# Patient Record
Sex: FEMALE | Race: OTHER
[De-identification: names, ages, dates, MRNs, and addresses within clinical notes are randomized per-mention and may not be internally consistent; named-entity substitution may affect disease eponyms.]

---

## 2021-07-25 ENCOUNTER — TRANSCRIPTION ENCOUNTER (OUTPATIENT)
Age: 41
End: 2021-07-25

## 2021-11-04 DIAGNOSIS — Z78.9 OTHER SPECIFIED HEALTH STATUS: ICD-10-CM

## 2021-11-04 DIAGNOSIS — Z82.49 FAMILY HISTORY OF ISCHEMIC HEART DISEASE AND OTHER DISEASES OF THE CIRCULATORY SYSTEM: ICD-10-CM

## 2021-11-04 RX ORDER — PRENATAL VIT 49/IRON FUM/FOLIC 6.75-0.2MG
TABLET ORAL
Refills: 0 | Status: ACTIVE | COMMUNITY

## 2021-11-08 ENCOUNTER — NON-APPOINTMENT (OUTPATIENT)
Age: 41
End: 2021-11-08

## 2021-11-08 ENCOUNTER — APPOINTMENT (OUTPATIENT)
Dept: CARDIOLOGY | Facility: CLINIC | Age: 41
End: 2021-11-08
Payer: COMMERCIAL

## 2021-11-08 VITALS
BODY MASS INDEX: 31.41 KG/M2 | SYSTOLIC BLOOD PRESSURE: 168 MMHG | WEIGHT: 160 LBS | HEART RATE: 90 BPM | HEIGHT: 60 IN | OXYGEN SATURATION: 100 % | DIASTOLIC BLOOD PRESSURE: 104 MMHG

## 2021-11-08 DIAGNOSIS — R01.1 CARDIAC MURMUR, UNSPECIFIED: ICD-10-CM

## 2021-11-08 PROCEDURE — 93000 ELECTROCARDIOGRAM COMPLETE: CPT

## 2021-11-08 PROCEDURE — 99204 OFFICE O/P NEW MOD 45 MIN: CPT

## 2021-11-08 NOTE — DISCUSSION/SUMMARY
[FreeTextEntry1] : Her blood pressure is clearly problematic today. I am confident that there is a degree of reactive hypertension. The question is how significant a degree of reactive hypertension there is.\par \par She will continue labetalol for now. She will schedule an echocardiogram. She will start checking her blood pressure regularly at home, and see me in the office for blood pressure check in 1 month. There is the possibility that she will require an ambulatory 24-hour blood pressure monitor.

## 2021-11-08 NOTE — HISTORY OF PRESENT ILLNESS
[FreeTextEntry1] : Sally presented to the office today for a cardiovascular evaluation. She presents for the further evaluation of elevated blood pressures.\par \par She is now 41 years old, with a history of elevated blood pressures for many years. She was not treated until recently. She does not have longstanding high cholesterol, but apparently was more recently discovered to have borderline lipid readings recently. She had a cardiac murmur that was diagnosed many years ago, for which an echocardiogram was performed in the past, apparently normal.\par \par At baseline, she is sedentary. She got  over the summer, and has gained weight since then, perhaps 15 to 20 pounds. With regular physical activities she reports experiencing a degree of dyspnea, not clearly out of proportion to her degree of weight gain and deconditioning. She denies orthopnea, PND and edema. She denies palpitations, dizziness and syncope.\par \par She is considering getting pregnant. She was recently discovered to have very elevated blood pressures, and was started on labetalol. She was told to see a cardiologist. Her parents have seen before many years, and she therefore comes to my office for evaluation.\par \par She does say that her blood pressure at home may be anywhere from the range of 120 up to 140, over 60.

## 2021-11-16 ENCOUNTER — MED ADMIN CHARGE (OUTPATIENT)
Age: 41
End: 2021-11-16

## 2021-11-16 ENCOUNTER — APPOINTMENT (OUTPATIENT)
Dept: CARDIOLOGY | Facility: CLINIC | Age: 41
End: 2021-11-16
Payer: COMMERCIAL

## 2021-11-16 PROCEDURE — 93306 TTE W/DOPPLER COMPLETE: CPT

## 2021-11-16 RX ORDER — PERFLUTREN 6.52 MG/ML
6.52 INJECTION, SUSPENSION INTRAVENOUS
Qty: 1 | Refills: 0 | Status: COMPLETED | OUTPATIENT
Start: 2021-11-16

## 2021-11-16 RX ADMIN — PERFLUTREN MG/ML: 6.52 INJECTION, SUSPENSION INTRAVENOUS at 00:00

## 2021-12-08 ENCOUNTER — APPOINTMENT (OUTPATIENT)
Dept: CARDIOLOGY | Facility: CLINIC | Age: 41
End: 2021-12-08
Payer: COMMERCIAL

## 2021-12-08 PROCEDURE — 93790 AMBL BP MNTR W/SW I&R: CPT

## 2021-12-16 ENCOUNTER — APPOINTMENT (OUTPATIENT)
Dept: CARDIOLOGY | Facility: CLINIC | Age: 41
End: 2021-12-16

## 2021-12-20 ENCOUNTER — APPOINTMENT (OUTPATIENT)
Dept: CARDIOLOGY | Facility: CLINIC | Age: 41
End: 2021-12-20
Payer: COMMERCIAL

## 2021-12-20 VITALS
HEIGHT: 60 IN | OXYGEN SATURATION: 100 % | HEART RATE: 121 BPM | BODY MASS INDEX: 31.8 KG/M2 | WEIGHT: 162 LBS | SYSTOLIC BLOOD PRESSURE: 185 MMHG | DIASTOLIC BLOOD PRESSURE: 132 MMHG

## 2021-12-20 PROCEDURE — 99214 OFFICE O/P EST MOD 30 MIN: CPT

## 2021-12-20 NOTE — HISTORY OF PRESENT ILLNESS
[FreeTextEntry1] : Sally presented to the office today for a cardiovascular evaluation.  I saw her 6 weeks ago.\par She is now 41 years old, with a history of elevated blood pressures for many years. She was not treated until recently. She does not have longstanding high cholesterol, but apparently was more recently discovered to have borderline lipid readings recently. She had a cardiac murmur that was diagnosed many years ago, for which an echocardiogram was performed in the past, apparently normal.\par \par At baseline, she is sedentary. She got  over the summer, and has gained weight since then, perhaps 15 to 20 pounds. With regular physical activities she reports experiencing a degree of dyspnea, not clearly out of proportion to her degree of weight gain and deconditioning. She denies orthopnea, PND and edema. She denies palpitations, dizziness and syncope.\par \par When I saw her 6 weeks ago, she was considering getting pregnant.  She was discovered to have very elevated blood pressures, and was started on labetalol.\par \par I saw her in the office, and I felt that there was a component of reactive hypertension.  She went on to have an ambulatory 24-hour blood pressure monitor.  This strongly suggested the presence of reactive hypertension.\par \par She continues to feel well.  She does not have any reproducible symptoms.  She remains compliant with labetalol.  She found wearing the blood pressure monitor very difficult.

## 2021-12-20 NOTE — DISCUSSION/SUMMARY
[FreeTextEntry1] : She has a very dramatic increase in her blood pressure with anxiety, and the simple doctor's visit today has managed to raise her blood pressure to very high levels.  I think that this is all reactive hypertension.\par \par Although theoretically we could even consider the possibility of reducing or discontinuing labetalol, I think it is wise to keep it as it is.  Her blood pressures are generally controlled on the labetalol, and so it is reasonable to continue it, without any real change.\par \par She will see me in 3 months.  She will call me with questions in the meantime.

## 2022-03-21 ENCOUNTER — NON-APPOINTMENT (OUTPATIENT)
Age: 42
End: 2022-03-21

## 2022-03-21 ENCOUNTER — APPOINTMENT (OUTPATIENT)
Dept: CARDIOLOGY | Facility: CLINIC | Age: 42
End: 2022-03-21
Payer: COMMERCIAL

## 2022-03-21 VITALS
OXYGEN SATURATION: 100 % | DIASTOLIC BLOOD PRESSURE: 120 MMHG | HEIGHT: 60 IN | HEART RATE: 115 BPM | SYSTOLIC BLOOD PRESSURE: 178 MMHG | WEIGHT: 143 LBS | BODY MASS INDEX: 28.07 KG/M2

## 2022-03-21 PROCEDURE — 93000 ELECTROCARDIOGRAM COMPLETE: CPT

## 2022-03-21 PROCEDURE — 99214 OFFICE O/P EST MOD 30 MIN: CPT

## 2022-03-21 NOTE — HISTORY OF PRESENT ILLNESS
[FreeTextEntry1] : Sally presented to the office today for a cardiovascular evaluation.  I saw her 3 months ago.\par \par She is now 41 years old, with a history of elevated blood pressures for many years. She was not treated until recently. She does not have longstanding high cholesterol, but apparently was more recently discovered to have borderline lipid readings recently. She had a cardiac murmur that was diagnosed many years ago, for which an echocardiogram was performed in the past, apparently normal.\par \par At baseline, she is sedentary. She got  over the summer, and has gained weight since then, perhaps 15 to 20 pounds. With regular physical activities she reports experiencing a degree of dyspnea, not clearly out of proportion to her degree of weight gain and deconditioning. She denies orthopnea, PND and edema. She denies palpitations, dizziness and syncope.\par \par When I saw her 6 weeks ago, she was considering getting pregnant.  She was discovered to have very elevated blood pressures, and was started on labetalol.\par \par I saw her in the office, and I felt that there was a component of reactive hypertension.  She went on to have an ambulatory 24-hour blood pressure monitor.  This strongly suggested the presence of reactive hypertension.\par \par She continues to feel well.  She does not have any reproducible symptoms.  She remains compliant with labetalol.  She found wearing the blood pressure monitor very difficult.

## 2022-03-21 NOTE — DISCUSSION/SUMMARY
[FreeTextEntry1] : She has a very dramatic increase in her blood pressure with anxiety, and the simple doctor's visit today has managed to raise her blood pressure to very high levels.  I think that this is all reactive hypertension.\par \par Although theoretically we could even consider the possibility of reducing or discontinuing labetalol, I think it is wise to keep it as it is.  Her blood pressures are generally controlled on the labetalol, and so it is reasonable to continue it, without any real change.\par \par She will see me in 6 months.  She will call me with questions in the meantime.

## 2022-08-04 ENCOUNTER — APPOINTMENT (OUTPATIENT)
Dept: CARDIOLOGY | Facility: CLINIC | Age: 42
End: 2022-08-04

## 2022-08-04 ENCOUNTER — NON-APPOINTMENT (OUTPATIENT)
Age: 42
End: 2022-08-04

## 2022-08-04 PROCEDURE — 99211 OFF/OP EST MAY X REQ PHY/QHP: CPT

## 2022-08-04 PROCEDURE — 93306 TTE W/DOPPLER COMPLETE: CPT

## 2022-10-20 ENCOUNTER — NON-APPOINTMENT (OUTPATIENT)
Age: 42
End: 2022-10-20

## 2022-10-20 ENCOUNTER — APPOINTMENT (OUTPATIENT)
Dept: CARDIOLOGY | Facility: CLINIC | Age: 42
End: 2022-10-20

## 2022-10-20 VITALS
DIASTOLIC BLOOD PRESSURE: 90 MMHG | BODY MASS INDEX: 31.02 KG/M2 | WEIGHT: 158 LBS | HEART RATE: 101 BPM | HEIGHT: 60 IN | SYSTOLIC BLOOD PRESSURE: 158 MMHG | OXYGEN SATURATION: 100 %

## 2022-10-20 VITALS — DIASTOLIC BLOOD PRESSURE: 80 MMHG | SYSTOLIC BLOOD PRESSURE: 148 MMHG

## 2022-10-20 PROCEDURE — 93000 ELECTROCARDIOGRAM COMPLETE: CPT

## 2022-10-20 PROCEDURE — 99214 OFFICE O/P EST MOD 30 MIN: CPT | Mod: 25

## 2022-10-20 NOTE — ADDENDUM
[FreeTextEntry1] : Her blood pressure is very clearly at least in part related to anxiety.  She is tachycardic and hypertensive.  I do not think we can rely on her blood pressure readings here in the office.  She will schedule ambulatory 24-hour blood pressure monitoring.  We will make adjustments as appropriate.

## 2022-10-20 NOTE — HISTORY OF PRESENT ILLNESS
[FreeTextEntry1] : Sally presented to the office today for a cardiovascular evaluation.  I saw her 3 months ago.\par \par She is now 42 years old, with a history of elevated blood pressures for many years. She was not treated until recently. She does not have longstanding high cholesterol, but apparently was more recently discovered to have borderline lipid readings recently. She had a cardiac murmur that was diagnosed many years ago, for which an echocardiogram was performed in the past, apparently normal.\par \par At baseline, she is sedentary. She got  over the summer, and has gained weight since then, perhaps 15 to 20 pounds. With regular physical activities she reports experiencing a degree of dyspnea, not clearly out of proportion to her degree of weight gain and deconditioning. She denies orthopnea, PND and edema. She denies palpitations, dizziness and syncope.\par \par When I saw her 6 weeks ago, she was considering getting pregnant.  She was discovered to have very elevated blood pressures, and was started on labetalol.\par \par I saw her in the office, and I felt that there was a component of reactive hypertension.  She went on to have an ambulatory 24-hour blood pressure monitor.  This strongly suggested the presence of reactive hypertension.\par \par She continues to feel well.  She does not have any reproducible symptoms.  She remains compliant with labetalol.  She found wearing the blood pressure monitor very difficult.   \par \par Ms Mares arrives today for followup visit.\par She presented to the ED on October 7th, she was sent by her OB because her blood pressure was 148/90.  HEr labetalol was then increased to 400 BID. \par \par She is concerned because she thinks she may have been experiencing an allergic reaction to it. Her symptoms started this past Tuesday, she has an appointment this coming Monday with her allergist to followup her symptoms.

## 2022-10-20 NOTE — DISCUSSION/SUMMARY
[FreeTextEntry1] : Ms Mares is now 26 weeks gestation. She arrives in no acute distress but is clearly anxious.  She is euvolemic on exam. ECG illustrates sinus rhythm with rate in 90s.  Her blood pressure is still not optimal at this time.\par \par  The simple doctor's visit today has managed to raise her blood pressure to very high levels.  I think that this is all reactive hypertension given the clear anxiety that she is presenting with today. Previous 24 hour blood pressure monitoring performed a year ago demonstrated avg reading of 128/79, though she was not pregnant at the time.\par I advised that we proceed with a 24 hour blood pressure monitor to fully assess her blood pressure before adjusting her current medication regimen.  She will continue on Labetolol 400mg twice a day for now. \par \par I will call her with results of the monitor.  She will have the report from her allergist sent to me.\par Followup in one month.  She will call me with any questions or concerns.

## 2022-10-20 NOTE — PHYSICAL EXAM
[Normal] : alert and oriented, normal memory [Well Developed] : well developed [Well Nourished] : well nourished [No Acute Distress] : no acute distress [Normal Conjunctiva] : normal conjunctiva [Normal Venous Pressure] : normal venous pressure [No Carotid Bruit] : no carotid bruit [Normal S1, S2] : normal S1, S2 [No Rub] : no rub [No Gallop] : no gallop [Rhythm Regular] : regular [Normal S1] : normal S1 [Normal S2] : normal S2 [No Murmur] : no murmurs heard [No Abnormalities] : the abdominal aorta was not enlarged and no bruit was heard [Clear Lung Fields] : clear lung fields [Good Air Entry] : good air entry [No Respiratory Distress] : no respiratory distress  [Soft] : abdomen soft [Non Tender] : non-tender [No Masses/organomegaly] : no masses/organomegaly [Normal Bowel Sounds] : normal bowel sounds [Normal Gait] : normal gait [No Edema] : no edema [No Cyanosis] : no cyanosis [No Clubbing] : no clubbing [No Varicosities] : no varicosities [No Rash] : no rash [No Skin Lesions] : no skin lesions [Moves all extremities] : moves all extremities [No Focal Deficits] : no focal deficits [Normal Speech] : normal speech [Alert and Oriented] : alert and oriented [Normal memory] : normal memory [Right Carotid Bruit] : no bruit heard over the right carotid [Left Carotid Bruit] : no bruit heard over the left carotid

## 2022-10-31 ENCOUNTER — APPOINTMENT (OUTPATIENT)
Dept: CARDIOLOGY | Facility: CLINIC | Age: 42
End: 2022-10-31

## 2022-10-31 PROCEDURE — 93790 AMBL BP MNTR W/SW I&R: CPT

## 2022-11-03 ENCOUNTER — NON-APPOINTMENT (OUTPATIENT)
Age: 42
End: 2022-11-03

## 2022-12-29 ENCOUNTER — NON-APPOINTMENT (OUTPATIENT)
Age: 42
End: 2022-12-29

## 2022-12-29 ENCOUNTER — APPOINTMENT (OUTPATIENT)
Dept: CARDIOLOGY | Facility: CLINIC | Age: 42
End: 2022-12-29
Payer: COMMERCIAL

## 2022-12-29 VITALS
BODY MASS INDEX: 35.34 KG/M2 | HEART RATE: 114 BPM | DIASTOLIC BLOOD PRESSURE: 92 MMHG | HEIGHT: 60 IN | WEIGHT: 180 LBS | SYSTOLIC BLOOD PRESSURE: 159 MMHG | OXYGEN SATURATION: 100 %

## 2022-12-29 VITALS — SYSTOLIC BLOOD PRESSURE: 130 MMHG | DIASTOLIC BLOOD PRESSURE: 70 MMHG

## 2022-12-29 PROCEDURE — 93000 ELECTROCARDIOGRAM COMPLETE: CPT

## 2022-12-29 PROCEDURE — 99214 OFFICE O/P EST MOD 30 MIN: CPT

## 2023-01-06 ENCOUNTER — NON-APPOINTMENT (OUTPATIENT)
Age: 43
End: 2023-01-06

## 2023-01-06 NOTE — PHYSICAL EXAM
[Well Developed] : well developed [Well Nourished] : well nourished [No Acute Distress] : no acute distress [Normal Conjunctiva] : normal conjunctiva [Normal Venous Pressure] : normal venous pressure [No Carotid Bruit] : no carotid bruit [Normal S1, S2] : normal S1, S2 [No Rub] : no rub [No Gallop] : no gallop [Rhythm Regular] : regular [Normal S1] : normal S1 [Normal S2] : normal S2 [No Murmur] : no murmurs heard [No Abnormalities] : the abdominal aorta was not enlarged and no bruit was heard [Clear Lung Fields] : clear lung fields [Good Air Entry] : good air entry [No Respiratory Distress] : no respiratory distress  [Normal Gait] : normal gait [No Cyanosis] : no cyanosis [No Clubbing] : no clubbing [No Varicosities] : no varicosities [No Rash] : no rash [No Skin Lesions] : no skin lesions [Moves all extremities] : moves all extremities [No Focal Deficits] : no focal deficits [Normal Speech] : normal speech [Normal] : alert and oriented, normal memory [Alert and Oriented] : alert and oriented [Normal memory] : normal memory [Right Carotid Bruit] : no bruit heard over the right carotid [Left Carotid Bruit] : no bruit heard over the left carotid [de-identified] : s/p   [de-identified] : mild ankle edema

## 2023-01-06 NOTE — END OF VISIT
[FreeTextEntry3] : I saw and evaluated the patient and discussed the care with the NP provider above on 12/29/2022 . I agree with the findings and plan as documented in the note above. cont labetalol as above. hold off on nifedipine. She will try to maintain a BP log at home. Reducing dietary salt intake advised.

## 2023-01-06 NOTE — DISCUSSION/SUMMARY
[FreeTextEntry1] : Ms Mares is now 1 week post partum. She arrives in no acute distress but is clearly anxious.  She is euvolemic on exam. ECG illustrates sinus tachycardia .  Her blood pressure is still not optimal but improved some after resting awhile in the office.\par \par Because has responded well overall to labetalol, I have advised she continue for now.  We will hold off adding nifedipine , given her home blood pressure log demonstrated normal pressures after labetalol dosing. \par She will take Labetolol 300mg in AM, 400mg in afternoon and 300mg again at night.\par she will continue to check her blood pressure a few times a day after her labetalol dose. \par \par \par Followup at about 4- 6 weeks post partum.  She will call me with any questions or concerns.

## 2023-01-06 NOTE — HISTORY OF PRESENT ILLNESS
[FreeTextEntry1] : Sally presented to the office today for a cardiovascular evaluation.  I saw her 3 months ago.\par \par She is now 42 years old, with a history of elevated blood pressures for many years. She was not treated until recently. She does not have longstanding high cholesterol, but apparently was more recently discovered to have borderline lipid readings recently. She had a cardiac murmur that was diagnosed many years ago, for which an echocardiogram was performed in the past, apparently normal.\par \par At baseline, she is sedentary. She got  over the summer, and has gained weight since then, perhaps 15 to 20 pounds. With regular physical activities she reports experiencing a degree of dyspnea, not clearly out of proportion to her degree of weight gain and deconditioning. She denies orthopnea, PND and edema. She denies palpitations, dizziness and syncope.\par \par She was considering getting pregnant.  She was discovered to have very elevated blood pressures, and was started on labetalol.\par  I felt that there was a component of reactive hypertension.  She went on to have an ambulatory 24-hour blood pressure monitor.  This strongly suggested the presence of reactive hypertension.\par She is on Labetolol 400 BID.\par \par She was last seen in the office here on 2022 at that time, she was 26 weeks at that time.\par She was since being followed by her high risk team regularly due to her spiking pressures in her last trimester.  She had been hospitalized several times due to her pressure.  \par She finally had to be admitted for  on the 22 due to elevated pressures.  She was given IV magnesium a few times post partum.  Labetalol was increased to 300mg TID, she was encouraged to start nifedipine 30mg on discharge from the hospital, but she wanted to wait to see how her blood pressures were doing at home.\par they performed an echocardiogram during her admission.\par \par she is now 1 week postpartum today.\par \par She denies chest pains or pressure. She  denies dizzy spells, feeling faint or fainting, She   denies palpitations or difficulty breathing while laying flat. She denies lower extremity swelling.\par

## 2023-01-06 NOTE — CARDIOLOGY SUMMARY
[de-identified] : 12/29/22 sinus tachycardia [de-identified] : 8/2022\par EF 65% mild MR , NML LV

## 2023-02-01 ENCOUNTER — APPOINTMENT (OUTPATIENT)
Dept: CARDIOLOGY | Facility: CLINIC | Age: 43
End: 2023-02-01
Payer: COMMERCIAL

## 2023-02-01 ENCOUNTER — NON-APPOINTMENT (OUTPATIENT)
Age: 43
End: 2023-02-01

## 2023-02-01 VITALS
HEART RATE: 84 BPM | SYSTOLIC BLOOD PRESSURE: 131 MMHG | HEIGHT: 60 IN | OXYGEN SATURATION: 100 % | WEIGHT: 160 LBS | BODY MASS INDEX: 31.41 KG/M2 | DIASTOLIC BLOOD PRESSURE: 83 MMHG

## 2023-02-01 VITALS — SYSTOLIC BLOOD PRESSURE: 118 MMHG | DIASTOLIC BLOOD PRESSURE: 78 MMHG

## 2023-02-01 PROCEDURE — 93000 ELECTROCARDIOGRAM COMPLETE: CPT

## 2023-02-01 PROCEDURE — 99214 OFFICE O/P EST MOD 30 MIN: CPT | Mod: 25

## 2023-02-02 NOTE — ADDENDUM
[FreeTextEntry1] : 5w postpartum\par bp controlled\par will keep meds as they are\par followup as may need to down titrate

## 2023-02-02 NOTE — HISTORY OF PRESENT ILLNESS
[FreeTextEntry1] : Sally presented to the office today for a cardiovascular evaluation. She was last seen here in the office on 22.\par She is now 42 years old, with a history of elevated blood pressures for many years. She was not treated until recently. She does not have longstanding high cholesterol, but apparently was more recently discovered to have borderline lipid readings recently. She had a cardiac murmur that was diagnosed many years ago, for which an echocardiogram was performed in the past, apparently normal.\par \par At baseline, she is sedentary. She got  over the summer, and has gained weight since then, perhaps 15 to 20 pounds. With regular physical activities she reports experiencing a degree of dyspnea, not clearly out of proportion to her degree of weight gain and deconditioning. She denies orthopnea, PND and edema. She denies palpitations, dizziness and syncope.\par \par She was considering getting pregnant.  She was discovered to have very elevated blood pressures, and was started on labetalol.\par  I felt that there was a component of reactive hypertension.  She went on to have an ambulatory 24-hour blood pressure monitor.  This strongly suggested the presence of reactive hypertension.\par She is on Labetolol 400 BID.\par \par She is s/p  on the 22 due to elevated pressures.  She was given IV magnesium a few times post partum.  Labetalol was increased to 300mg TID, she was encouraged to start nifedipine 30mg on discharge from the hospital, but she wanted to wait to see how her blood pressures were doing at home.\par they performed an echocardiogram during her admission.\par \par She is now 5 weeks postpartum and feeling well.  The called the office at the beginning of the month due to elevated blood pressures.  NIfedipine was added to her regimen at that time.\par She states her blood pressure has improved significantly since that addition.  AVG blood pressure at home is 114/61 now.\par \par She denies chest pains or pressure. She  denies dizzy spells, feeling faint or fainting, She   denies palpitations or difficulty breathing while laying flat. She does note some ankle swelling.\par

## 2023-02-02 NOTE — DISCUSSION/SUMMARY
[FreeTextEntry1] : Ms Mares is now 5 weeks post partum. She arrives in no acute distress.  She is euvolemic on exam. ECG illustrates sinus rhythm .  Her blood pressure has optimized now.\par \par She will continue her current regimen of Nifedipine 30mg ER every night, Labetolol 200mg TID.\par she will continue to check her blood pressure a few times a day after her labetalol doses. \par Her ankle swelling is likely due to Nifedipine as well as her post partum volume shifts. She does not present with symptoms of HF.\par She will continue to monitor.  She will elevate her legs when resting in a chair.  \par \par NOw that her blood pressure has normalized, she can followup again in 2 months. \par She will call me with any questions or concerns.

## 2023-02-02 NOTE — PHYSICAL EXAM
[Well Developed] : well developed [Well Nourished] : well nourished [No Acute Distress] : no acute distress [Normal Conjunctiva] : normal conjunctiva [Normal Venous Pressure] : normal venous pressure [No Carotid Bruit] : no carotid bruit [Normal S1, S2] : normal S1, S2 [No Rub] : no rub [No Gallop] : no gallop [Rhythm Regular] : regular [Normal S1] : normal S1 [Normal S2] : normal S2 [No Murmur] : no murmurs heard [No Abnormalities] : the abdominal aorta was not enlarged and no bruit was heard [Clear Lung Fields] : clear lung fields [Good Air Entry] : good air entry [No Respiratory Distress] : no respiratory distress  [Normal Gait] : normal gait [No Cyanosis] : no cyanosis [No Clubbing] : no clubbing [No Varicosities] : no varicosities [No Rash] : no rash [No Skin Lesions] : no skin lesions [Moves all extremities] : moves all extremities [No Focal Deficits] : no focal deficits [Normal Speech] : normal speech [Normal] : alert and oriented, normal memory [Alert and Oriented] : alert and oriented [Normal memory] : normal memory [Right Carotid Bruit] : no bruit heard over the right carotid [Left Carotid Bruit] : no bruit heard over the left carotid [de-identified] : s/p   [de-identified] : mild ankle edema

## 2023-04-17 ENCOUNTER — RX RENEWAL (OUTPATIENT)
Age: 43
End: 2023-04-17

## 2023-04-20 ENCOUNTER — NON-APPOINTMENT (OUTPATIENT)
Age: 43
End: 2023-04-20

## 2023-04-20 ENCOUNTER — APPOINTMENT (OUTPATIENT)
Dept: CARDIOLOGY | Facility: CLINIC | Age: 43
End: 2023-04-20
Payer: COMMERCIAL

## 2023-04-20 VITALS
HEART RATE: 92 BPM | SYSTOLIC BLOOD PRESSURE: 159 MMHG | BODY MASS INDEX: 33.38 KG/M2 | HEIGHT: 60 IN | OXYGEN SATURATION: 99 % | WEIGHT: 170 LBS | DIASTOLIC BLOOD PRESSURE: 96 MMHG

## 2023-04-20 PROCEDURE — 99214 OFFICE O/P EST MOD 30 MIN: CPT | Mod: 25

## 2023-04-20 PROCEDURE — 93000 ELECTROCARDIOGRAM COMPLETE: CPT

## 2023-04-20 RX ORDER — NIFEDIPINE 30 MG/1
30 TABLET, EXTENDED RELEASE ORAL DAILY
Qty: 90 | Refills: 3 | Status: DISCONTINUED | COMMUNITY
Start: 2023-01-04 | End: 2023-04-20

## 2023-04-20 NOTE — CARDIOLOGY SUMMARY
[de-identified] : April 20, 2023 normal sinus rhythm [de-identified] : 8/2022\par EF 65% mild MR , NML LV

## 2023-04-20 NOTE — PHYSICAL EXAM
[Well Developed] : well developed [Well Nourished] : well nourished [No Acute Distress] : no acute distress [Normal Conjunctiva] : normal conjunctiva [Normal Venous Pressure] : normal venous pressure [No Carotid Bruit] : no carotid bruit [Normal S1, S2] : normal S1, S2 [No Rub] : no rub [No Gallop] : no gallop [Rhythm Regular] : regular [Normal S1] : normal S1 [Normal S2] : normal S2 [No Murmur] : no murmurs heard [No Abnormalities] : the abdominal aorta was not enlarged and no bruit was heard [Clear Lung Fields] : clear lung fields [Good Air Entry] : good air entry [No Respiratory Distress] : no respiratory distress  [Normal Gait] : normal gait [No Cyanosis] : no cyanosis [No Clubbing] : no clubbing [No Varicosities] : no varicosities [No Rash] : no rash [No Skin Lesions] : no skin lesions [Moves all extremities] : moves all extremities [No Focal Deficits] : no focal deficits [Normal Speech] : normal speech [Normal] : alert and oriented, normal memory [Alert and Oriented] : alert and oriented [Normal memory] : normal memory [Right Carotid Bruit] : no bruit heard over the right carotid [Left Carotid Bruit] : no bruit heard over the left carotid [de-identified] : s/p   [de-identified] : mild ankle edema

## 2023-04-20 NOTE — DISCUSSION/SUMMARY
[FreeTextEntry1] : Ms Mares is now 5 weeks post partum. She arrives in no acute distress.  She is euvolemic on exam. ECG illustrates sinus rhythm .  Her blood pressure at home is excellent.  Her blood pressure here in the office is terrible.\par \par Although her blood pressure today is quite elevated, anxiety clearly plays a role.  Her blood pressure on her machine at home is in the 110s over 50s to 60s.  Her blood pressure machine today here in the office reveals 170/100, consistent with a manual reading.  I believe that it is reasonable to discontinue nifedipine, despite the high reading in the office today.  She will continue labetalol.\par \par She is planning on getting pregnant again perhaps in the fall.  Rather than transition her from labetalol to something else, given her plan, we will continue labetalol if she requires longer term antihypertensive therapy.\par \par She will plan on seeing me again in about 3 months.  She will give us a call in about 2 weeks to let us know how her blood pressure is behaving off of nifedipine.

## 2023-04-20 NOTE — HISTORY OF PRESENT ILLNESS
[FreeTextEntry1] : Estelle presented to the office today for a cardiovascular evaluation.  She was last seen in the office nearly 3 months ago.\par \par She is now 42 years old, with a history of hypertension.  She has a history of hypercholesterolemia.  . She had a cardiac murmur that was diagnosed many years ago, for which an echocardiogram was performed in the past, apparently normal.\par \par She was considering getting pregnant.  She was discovered to have very elevated blood pressures, and was started on labetalol.  I felt that there was a component of reactive hypertension.  She went on to have an ambulatory 24-hour blood pressure monitor.  This strongly suggested the presence of reactive hypertension.  She was continued on the same dose of labetalol at that time.\par \par She had a  section at the end of , given her hypertension.  Postpartum, her labetalol was increased, and eventually, given persistent hypertension, nifedipine was added.  Her blood pressure improved after that.\par \par She presents to the office today having been feeling well.  She reports no chest discomfort or shortness of breath suggestive of angina.  She denies orthopnea, PND and lower extremity edema.  She denies palpitations, dizziness and syncope.  She reports that her blood pressure and cholesterol have been well controlled.  She is anxious today, as always.

## 2023-07-27 ENCOUNTER — APPOINTMENT (OUTPATIENT)
Dept: CARDIOLOGY | Facility: CLINIC | Age: 43
End: 2023-07-27
Payer: COMMERCIAL

## 2023-07-27 ENCOUNTER — NON-APPOINTMENT (OUTPATIENT)
Age: 43
End: 2023-07-27

## 2023-07-27 VITALS
WEIGHT: 169 LBS | HEART RATE: 107 BPM | OXYGEN SATURATION: 99 % | HEIGHT: 60 IN | BODY MASS INDEX: 33.18 KG/M2 | DIASTOLIC BLOOD PRESSURE: 101 MMHG | SYSTOLIC BLOOD PRESSURE: 173 MMHG

## 2023-07-27 PROCEDURE — 93000 ELECTROCARDIOGRAM COMPLETE: CPT

## 2023-07-27 PROCEDURE — 99214 OFFICE O/P EST MOD 30 MIN: CPT | Mod: 25

## 2023-07-27 NOTE — CARDIOLOGY SUMMARY
[de-identified] : April 20, 2023 normal sinus rhythm\par July 27, 2023 normal sinus rhythm [de-identified] : 8/2022\par EF 65% mild MR , NML LV

## 2023-07-27 NOTE — PHYSICAL EXAM
[Well Developed] : well developed [Well Nourished] : well nourished [No Acute Distress] : no acute distress [Normal Conjunctiva] : normal conjunctiva [Normal Venous Pressure] : normal venous pressure [Normal S1, S2] : normal S1, S2 [No Carotid Bruit] : no carotid bruit [No Rub] : no rub [No Gallop] : no gallop [Rhythm Regular] : regular [Normal S1] : normal S1 [Normal S2] : normal S2 [No Murmur] : no murmurs heard [No Abnormalities] : the abdominal aorta was not enlarged and no bruit was heard [Clear Lung Fields] : clear lung fields [Good Air Entry] : good air entry [No Respiratory Distress] : no respiratory distress  [Normal Gait] : normal gait [No Cyanosis] : no cyanosis [No Clubbing] : no clubbing [No Varicosities] : no varicosities [No Rash] : no rash [No Skin Lesions] : no skin lesions [Moves all extremities] : moves all extremities [No Focal Deficits] : no focal deficits [Normal Speech] : normal speech [Normal] : alert and oriented, normal memory [Alert and Oriented] : alert and oriented [Normal memory] : normal memory [Right Carotid Bruit] : no bruit heard over the right carotid [Left Carotid Bruit] : no bruit heard over the left carotid [de-identified] : s/p   [de-identified] : mild ankle edema

## 2023-07-27 NOTE — HISTORY OF PRESENT ILLNESS
[FreeTextEntry1] : Estelle presented to the office today for a cardiovascular evaluation.  She was last seen in the office 3 months ago.\par \par She is now 42 years old, with a history of hypertension.  She has a history of hypercholesterolemia.  . She had a cardiac murmur that was diagnosed many years ago, for which an echocardiogram was performed in the past, apparently normal.\par \par She was considering getting pregnant.  She was discovered to have very elevated blood pressures, and was started on labetalol.  I felt that there was a component of reactive hypertension.  She went on to have an ambulatory 24-hour blood pressure monitor.  This strongly suggested the presence of reactive hypertension.  She was continued on the same dose of labetalol at that time.\par \par She had a  section at the end of , given her hypertension.  Postpartum, her labetalol was increased, and eventually, given persistent hypertension, nifedipine was added.  Her blood pressure improved after that.  I saw her in the office in 2023.  Although her blood pressure here in the office was very high, at home it was well controlled, and I discontinued nifedipine.  I did not change her from labetalol to anything else given plans to become pregnant again, potentially as soon as this fall.\par \par She presents to the office today having been feeling well.  She reports no chest discomfort or shortness of breath suggestive of angina.  She denies orthopnea, PND and lower extremity edema.  She denies palpitations, dizziness and syncope.  Reports that her blood pressure has not been perfectly controlled since we stopped her Procardia, and is now hovering in the range of 140 quite frequently.

## 2023-07-27 NOTE — DISCUSSION/SUMMARY
[FreeTextEntry1] : Ms Mares feels well from a cardiovascular perspective.  She is euvolemic.  Her blood pressure has climbed a bit since we stopped her Procardia.  She does not want to resume it as it causes edema of her feet.  We will try to cautiously increase labetalol.\par \par Although her blood pressure today is quite elevated, anxiety clearly plays a role.  I think overall she has largely controlled, though not perfectly controlled, essential hypertension with superimposed and severe reactive hypertension.\par \par She is planning on getting pregnant again perhaps in the fall.  Rather than transition her from labetalol to something else, given her plan, we will continue labetalol.  Given some apparent salt sensitivity, ultimately the use of a diuretic would be helpful.\par \par She will plan on seeing me again in about 3 months.

## 2023-11-09 ENCOUNTER — APPOINTMENT (OUTPATIENT)
Dept: CARDIOLOGY | Facility: CLINIC | Age: 43
End: 2023-11-09
Payer: COMMERCIAL

## 2023-11-09 ENCOUNTER — NON-APPOINTMENT (OUTPATIENT)
Age: 43
End: 2023-11-09

## 2023-11-09 VITALS
WEIGHT: 157 LBS | OXYGEN SATURATION: 100 % | HEART RATE: 93 BPM | HEIGHT: 60 IN | SYSTOLIC BLOOD PRESSURE: 180 MMHG | DIASTOLIC BLOOD PRESSURE: 110 MMHG | BODY MASS INDEX: 30.82 KG/M2

## 2023-11-09 PROCEDURE — 99214 OFFICE O/P EST MOD 30 MIN: CPT | Mod: 25

## 2023-11-09 PROCEDURE — 93000 ELECTROCARDIOGRAM COMPLETE: CPT

## 2023-11-27 ENCOUNTER — APPOINTMENT (OUTPATIENT)
Dept: CARDIOLOGY | Facility: CLINIC | Age: 43
End: 2023-11-27
Payer: COMMERCIAL

## 2023-11-27 PROCEDURE — 93790 AMBL BP MNTR W/SW I&R: CPT

## 2023-11-30 RX ORDER — LABETALOL HYDROCHLORIDE 200 MG/1
200 TABLET, FILM COATED ORAL
Qty: 630 | Refills: 3 | Status: ACTIVE | COMMUNITY
Start: 2023-04-17

## 2024-02-08 ENCOUNTER — APPOINTMENT (OUTPATIENT)
Dept: CARDIOLOGY | Facility: CLINIC | Age: 44
End: 2024-02-08
Payer: COMMERCIAL

## 2024-02-08 ENCOUNTER — NON-APPOINTMENT (OUTPATIENT)
Age: 44
End: 2024-02-08

## 2024-02-08 VITALS
DIASTOLIC BLOOD PRESSURE: 115 MMHG | SYSTOLIC BLOOD PRESSURE: 169 MMHG | BODY MASS INDEX: 31.02 KG/M2 | HEIGHT: 60 IN | HEART RATE: 95 BPM | WEIGHT: 158 LBS | OXYGEN SATURATION: 98 %

## 2024-02-08 DIAGNOSIS — E78.00 PURE HYPERCHOLESTEROLEMIA, UNSPECIFIED: ICD-10-CM

## 2024-02-08 DIAGNOSIS — I10 ESSENTIAL (PRIMARY) HYPERTENSION: ICD-10-CM

## 2024-02-08 PROCEDURE — 99214 OFFICE O/P EST MOD 30 MIN: CPT | Mod: 25

## 2024-02-08 PROCEDURE — 93000 ELECTROCARDIOGRAM COMPLETE: CPT

## 2024-02-08 NOTE — DISCUSSION/SUMMARY
[FreeTextEntry1] : Ms Mares feels well from a cardiovascular perspective.  She is euvolemic.  Her blood pressure is exceptionally high today.  I suspect that this is predominantly if not entirely reactive hypertension.   I reviewed her 24-hour blood pressure monitor from November 2023.  She is planning on getting pregnant again.  Rather than transition her from labetalol to something else, given her plan, we will continue labetalol.  Given some apparent salt sensitivity, ultimately the use of a diuretic would be helpful.  I suspect that her need to take a nap in the middle of the afternoon is likely on the basis of sleep deprivation, and not hypotension.  She will check her blood pressure a little bit more regularly at that time of day, when she feels the symptoms, and we can see if she is in fact hypotensive.  If all is well she will see me in 6 months. [EKG obtained to assist in diagnosis and management of assessed problem(s)] : EKG obtained to assist in diagnosis and management of assessed problem(s)

## 2024-02-08 NOTE — CARDIOLOGY SUMMARY
[de-identified] : April 20, 2023 normal sinus rhythm July 27, 2023 normal sinus rhythm November 9, 2023 normal sinus rhythm February 8, 2024 normal sinus rhythm [de-identified] : 8/2022\par  EF 65% mild MR , NML LV

## 2024-02-08 NOTE — HISTORY OF PRESENT ILLNESS
[FreeTextEntry1] : Estelle presented to the office today for a cardiovascular evaluation.  She was last seen in the office 3 months ago.  She is now 43 years old, with a history of hypertension.  She has a history of hypercholesterolemia.  . She had a cardiac murmur that was diagnosed many years ago, for which an echocardiogram was performed in the past, apparently normal.  She was considering getting pregnant.  She was discovered to have very elevated blood pressures, and was started on labetalol.  I felt that there was a component of reactive hypertension.  She went on to have an ambulatory 24-hour blood pressure monitor.  This strongly suggested the presence of reactive hypertension.  She was continued on the same dose of labetalol at that time.  She had a  section at the end of , given her hypertension.  Postpartum, her labetalol was increased, and eventually, given persistent hypertension, nifedipine was added.  Her blood pressure improved after that.  I saw her in the office in 2023.  Although her blood pressure here in the office was very high, at home it was well controlled, and I discontinued nifedipine.  I did not change her from labetalol to anything else given plans to become pregnant again, potentially as soon as this fall.  I saw her in 2023, feeling well at that time.  Her blood pressure was not ideal, since stopping Procardia.  Given plans to potentially become pregnant, I did not change her medication to a more standard agent at that time.  Given concerns about her hypertension, around the time of her last visit in 2023 I had her wear a 24-hour ambulatory blood pressure monitor.  Her blood pressure was initially controlled, but she made the mistake of checking the blood pressure numbers on the second day, and her blood pressure started to increase.  We did not change her medication at that time.  She presents to the office today having been feeling well.  She reports no chest discomfort or shortness of breath suggestive of angina.  She denies orthopnea, PND and lower extremity edema.  She denies palpitations, and syncope.  She is concerned because she feels a little fatigued and possibly lightheaded in the middle of the afternoon.  She typically takes a nap, or struggles, and has a cup of coffee trying to stay awake.  There is concerned that perhaps her blood pressure is going too low at those times, though she has not really check it very frequently under those circumstances.  Her blood pressure has been typically quite well-controlled overall.

## 2024-02-08 NOTE — PHYSICAL EXAM
[Well Developed] : well developed [Well Nourished] : well nourished [No Acute Distress] : no acute distress [Normal Conjunctiva] : normal conjunctiva [Normal Venous Pressure] : normal venous pressure [No Carotid Bruit] : no carotid bruit [Normal S1, S2] : normal S1, S2 [No Rub] : no rub [No Gallop] : no gallop [Rhythm Regular] : regular [Normal S1] : normal S1 [Normal S2] : normal S2 [No Murmur] : no murmurs heard [No Abnormalities] : the abdominal aorta was not enlarged and no bruit was heard [Clear Lung Fields] : clear lung fields [Good Air Entry] : good air entry [No Respiratory Distress] : no respiratory distress  [Normal Gait] : normal gait [No Cyanosis] : no cyanosis [No Clubbing] : no clubbing [No Varicosities] : no varicosities [No Rash] : no rash [No Skin Lesions] : no skin lesions [Moves all extremities] : moves all extremities [No Focal Deficits] : no focal deficits [Normal Speech] : normal speech [Normal] : alert and oriented, normal memory [Alert and Oriented] : alert and oriented [Normal memory] : normal memory [Right Carotid Bruit] : no bruit heard over the right carotid [Left Carotid Bruit] : no bruit heard over the left carotid [de-identified] : s/p   [de-identified] : mild ankle edema

## 2024-08-15 ENCOUNTER — NON-APPOINTMENT (OUTPATIENT)
Age: 44
End: 2024-08-15

## 2024-08-15 ENCOUNTER — APPOINTMENT (OUTPATIENT)
Dept: CARDIOLOGY | Facility: CLINIC | Age: 44
End: 2024-08-15
Payer: COMMERCIAL

## 2024-08-15 VITALS
HEART RATE: 93 BPM | HEIGHT: 60 IN | DIASTOLIC BLOOD PRESSURE: 95 MMHG | SYSTOLIC BLOOD PRESSURE: 167 MMHG | BODY MASS INDEX: 30.63 KG/M2 | OXYGEN SATURATION: 99 % | WEIGHT: 156 LBS

## 2024-08-15 DIAGNOSIS — I10 ESSENTIAL (PRIMARY) HYPERTENSION: ICD-10-CM

## 2024-08-15 PROCEDURE — 93000 ELECTROCARDIOGRAM COMPLETE: CPT

## 2024-08-15 PROCEDURE — 99214 OFFICE O/P EST MOD 30 MIN: CPT | Mod: 25

## 2024-08-15 PROCEDURE — G2211 COMPLEX E/M VISIT ADD ON: CPT | Mod: NC

## 2024-08-15 NOTE — DISCUSSION/SUMMARY
[FreeTextEntry1] : Ms Mares feels well from a cardiovascular perspective.  She is euvolemic.  Her blood pressure is exceptionally high today.  I suspect that this is predominantly if not entirely reactive hypertension.   I reviewed her 24-hour blood pressure monitor from November 2023.  She is planning on getting pregnant again.  Rather than transition her from labetalol to something else, given her plan, we will continue labetalol.  Given some apparent salt sensitivity, ultimately the use of a diuretic would be helpful.  I her blood pressures in February were low, but that is 6 months ago.  I would certainly consider reducing her dose of labetalol, but I will wait until I have some more up-to-date readings, especially noting today's elevated readings in the office, which is her usual.  I will reduce labetalol if appropriate, and then have her come to see me in the office in 3 months.  If her blood pressures do not warrant an adjustment in medication, I will see her in 6 months. [EKG obtained to assist in diagnosis and management of assessed problem(s)] : EKG obtained to assist in diagnosis and management of assessed problem(s)

## 2024-08-15 NOTE — HISTORY OF PRESENT ILLNESS
[FreeTextEntry1] : Estelle presented to the office today for a cardiovascular evaluation.  She was last seen in the office 6 months ago.  She is now 44 years old, with a history of hypertension.  She has a history of hypercholesterolemia. She had a cardiac murmur that was diagnosed many years ago, for which an echocardiogram was performed in the past, apparently normal.  She was considering getting pregnant.  She was discovered to have very elevated blood pressures, and was started on labetalol.  I felt that there was a component of reactive hypertension.  She went on to have an ambulatory 24-hour blood pressure monitor.  This strongly suggested the presence of reactive hypertension.  She was continued on the same dose of labetalol at that time.  She had a  section at the end of , given her hypertension.  Postpartum, her labetalol was increased, and eventually, given persistent hypertension, nifedipine was added.  Her blood pressure improved after that.  I saw her in the office in 2023.  Although her blood pressure here in the office was very high, at home it was well controlled, and I discontinued nifedipine.  I did not change her from labetalol to anything else given plans to become pregnant again, potentially as soon as this fall.  I saw her in 2023, feeling well at that time.  Her blood pressure was not ideal, since stopping Procardia.  Given plans to potentially become pregnant, I did not change her medication to a more standard agent at that time.  Given concerns about her hypertension, around the time of her last visit in 2023 I had her wear a 24-hour ambulatory blood pressure monitor.  Her blood pressure was initially controlled, but she made the mistake of checking the blood pressure numbers on the second day, and her blood pressure started to increase.  We did not change her medication at that time.  She presents to the office today having been feeling well.  She reports no chest discomfort or shortness of breath suggestive of angina.  She denies orthopnea, PND and lower extremity edema.  She denies palpitations, and syncope.  She is concerned because she feels a little fatigued and possibly lightheaded in the middle of the afternoon.  She typically takes a nap, or struggles, and has a cup of coffee trying to stay awake.  There is concerned that perhaps her blood pressure is going too low at those times, though she has not really check it very frequently under those circumstances.  Because of symptoms like this, I did have her check her blood pressure months ago.  She took her blood pressure in February, and found readings that were typically in the 100s-110s/50s.  She has not been checking it more recently.

## 2024-08-15 NOTE — CARDIOLOGY SUMMARY
[de-identified] : April 20, 2023 normal sinus rhythm July 27, 2023 normal sinus rhythm November 9, 2023 normal sinus rhythm February 8, 2024 normal sinus rhythm [de-identified] : 8/2022\par  EF 65% mild MR , NML LV

## 2024-08-15 NOTE — PHYSICAL EXAM
[Well Developed] : well developed [Well Nourished] : well nourished [No Acute Distress] : no acute distress [Normal Conjunctiva] : normal conjunctiva [No Carotid Bruit] : no carotid bruit [Normal Venous Pressure] : normal venous pressure [Normal S1, S2] : normal S1, S2 [No Rub] : no rub [No Gallop] : no gallop [Rhythm Regular] : regular [Normal S1] : normal S1 [Normal S2] : normal S2 [No Murmur] : no murmurs heard [No Abnormalities] : the abdominal aorta was not enlarged and no bruit was heard [Clear Lung Fields] : clear lung fields [Good Air Entry] : good air entry [No Respiratory Distress] : no respiratory distress  [Normal Gait] : normal gait [No Cyanosis] : no cyanosis [No Clubbing] : no clubbing [No Varicosities] : no varicosities [No Rash] : no rash [No Skin Lesions] : no skin lesions [Moves all extremities] : moves all extremities [No Focal Deficits] : no focal deficits [Normal Speech] : normal speech [Normal] : alert and oriented, normal memory [Alert and Oriented] : alert and oriented [Normal memory] : normal memory [Right Carotid Bruit] : no bruit heard over the right carotid [Left Carotid Bruit] : no bruit heard over the left carotid [de-identified] : mild ankle edema [de-identified] : s/p

## 2024-12-09 ENCOUNTER — RX RENEWAL (OUTPATIENT)
Age: 44
End: 2024-12-09

## 2025-01-21 ENCOUNTER — NON-APPOINTMENT (OUTPATIENT)
Age: 45
End: 2025-01-21

## 2025-03-04 ENCOUNTER — NON-APPOINTMENT (OUTPATIENT)
Age: 45
End: 2025-03-04

## 2025-03-04 ENCOUNTER — APPOINTMENT (OUTPATIENT)
Dept: CARDIOLOGY | Facility: CLINIC | Age: 45
End: 2025-03-04
Payer: COMMERCIAL

## 2025-03-04 VITALS
SYSTOLIC BLOOD PRESSURE: 148 MMHG | WEIGHT: 160 LBS | DIASTOLIC BLOOD PRESSURE: 91 MMHG | HEART RATE: 91 BPM | OXYGEN SATURATION: 99 % | HEIGHT: 60 IN | BODY MASS INDEX: 31.41 KG/M2

## 2025-03-04 VITALS — BODY MASS INDEX: 29.88 KG/M2 | WEIGHT: 153 LBS

## 2025-03-04 DIAGNOSIS — I10 ESSENTIAL (PRIMARY) HYPERTENSION: ICD-10-CM

## 2025-03-04 PROCEDURE — 99214 OFFICE O/P EST MOD 30 MIN: CPT | Mod: 25

## 2025-03-04 PROCEDURE — 93000 ELECTROCARDIOGRAM COMPLETE: CPT

## 2025-08-26 ENCOUNTER — APPOINTMENT (OUTPATIENT)
Dept: CARDIOLOGY | Facility: CLINIC | Age: 45
End: 2025-08-26
Payer: COMMERCIAL

## 2025-08-26 VITALS
WEIGHT: 171 LBS | HEART RATE: 116 BPM | DIASTOLIC BLOOD PRESSURE: 87 MMHG | OXYGEN SATURATION: 99 % | HEIGHT: 64 IN | BODY MASS INDEX: 29.19 KG/M2 | SYSTOLIC BLOOD PRESSURE: 150 MMHG

## 2025-08-26 VITALS — SYSTOLIC BLOOD PRESSURE: 132 MMHG | DIASTOLIC BLOOD PRESSURE: 78 MMHG

## 2025-08-26 DIAGNOSIS — I10 ESSENTIAL (PRIMARY) HYPERTENSION: ICD-10-CM

## 2025-08-26 PROCEDURE — 99214 OFFICE O/P EST MOD 30 MIN: CPT | Mod: 25

## 2025-08-26 PROCEDURE — 93000 ELECTROCARDIOGRAM COMPLETE: CPT

## 2025-09-02 ENCOUNTER — APPOINTMENT (OUTPATIENT)
Dept: CARDIOLOGY | Facility: CLINIC | Age: 45
End: 2025-09-02
Payer: COMMERCIAL

## 2025-09-02 PROCEDURE — 93306 TTE W/DOPPLER COMPLETE: CPT
